# Patient Record
Sex: FEMALE | Race: WHITE | Employment: UNEMPLOYED | URBAN - METROPOLITAN AREA
[De-identification: names, ages, dates, MRNs, and addresses within clinical notes are randomized per-mention and may not be internally consistent; named-entity substitution may affect disease eponyms.]

---

## 2021-05-11 ENCOUNTER — HOSPITAL ENCOUNTER (EMERGENCY)
Age: 51
Discharge: HOME | End: 2021-05-11
Attending: EMERGENCY MEDICINE
Payer: OTHER GOVERNMENT

## 2021-05-11 ENCOUNTER — APPOINTMENT (OUTPATIENT)
Dept: GENERAL RADIOLOGY | Age: 51
End: 2021-05-11
Attending: PHYSICIAN ASSISTANT
Payer: OTHER GOVERNMENT

## 2021-05-11 VITALS
HEART RATE: 93 BPM | RESPIRATION RATE: 18 BRPM | OXYGEN SATURATION: 100 % | WEIGHT: 160 LBS | BODY MASS INDEX: 27.31 KG/M2 | SYSTOLIC BLOOD PRESSURE: 146 MMHG | TEMPERATURE: 98.4 F | DIASTOLIC BLOOD PRESSURE: 80 MMHG | HEIGHT: 64 IN

## 2021-05-11 DIAGNOSIS — S61.219A COMPLICATED LACERATION OF FINGER, INITIAL ENCOUNTER: Primary | ICD-10-CM

## 2021-05-11 PROCEDURE — 73140 X-RAY EXAM OF FINGER(S): CPT

## 2021-05-11 PROCEDURE — 99283 EMERGENCY DEPT VISIT LOW MDM: CPT

## 2021-05-11 PROCEDURE — 77030008304 HC SPLNT FNGR ALUM DERY -A

## 2021-05-11 RX ORDER — CEPHALEXIN 500 MG/1
500 CAPSULE ORAL 4 TIMES DAILY
Qty: 28 CAP | Refills: 0 | Status: SHIPPED | OUTPATIENT
Start: 2021-05-11 | End: 2021-05-18

## 2021-05-11 NOTE — ED PROVIDER NOTES
EMERGENCY DEPARTMENT HISTORY AND PHYSICAL EXAM    Date: 5/11/2021  Patient Name: Cindy Teixeira    History of Presenting Illness     Chief Complaint   Patient presents with    Laceration         History Provided By: Patient    Chief Complaint: laceration to right fifth digit 4 days ago    HPI(Context):   12:25 PM  Cindy Teixeira is a 52 y.o. female with PMHX of anemia who presents to the emergency department C/O laceration to right fifth digit. Associated sxs include reduced ROM of affected finger. Pt reports 4 days ago she was using a recently sharpened knife when she cut her right fifth finger. Pt reports it bled for some time with good hemostasis with pressure. Pt has been cleaning wound with soap and water. Pt was at work and attempted to type today and felt more pain to finger with reduced ROM of affected finger. Pt is concerned for tendon involvement. Last tetanus within 5 years. Pt denies hx of DMII. Sofiya Rad Pt denies numbness, weakness, color change, and any other sxs or complaints. Pt is active smoker. PCP: Jerome      Past History     Past Medical History:  Past Medical History:   Diagnosis Date    Anemia        Past Surgical History:  Past Surgical History:   Procedure Laterality Date    HX APPENDECTOMY         Family History:  History reviewed. No pertinent family history. Social History:  Social History     Tobacco Use    Smoking status: Current Every Day Smoker     Packs/day: 0.50    Smokeless tobacco: Never Used   Substance Use Topics    Alcohol use: Yes     Alcohol/week: 36.0 standard drinks     Types: 36 Cans of beer per week    Drug use: Yes     Types: Marijuana       Allergies: Allergies   Allergen Reactions    Penicillins Hives         Review of Systems   Review of Systems   Musculoskeletal: Positive for arthralgias and joint swelling. Skin: Positive for wound. Allergic/Immunologic: Negative for immunocompromised state. Neurological: Negative for weakness and numbness.    All other systems reviewed and are negative. Physical Exam     Vitals:    05/11/21 1225   BP: (!) 146/80   Pulse: 93   Resp: 18   Temp: 98.4 °F (36.9 °C)   SpO2: 100%   Weight: 72.6 kg (160 lb)   Height: 5' 4\" (1.626 m)     Physical Exam  Vitals signs and nursing note reviewed. Constitutional:       General: She is not in acute distress. Appearance: Normal appearance. Comments:  female in NAD. Alert. Appears comfortable. Ambulatory in ED fast track room. HENT:      Head: Normocephalic and atraumatic. Right Ear: External ear normal.      Left Ear: External ear normal.      Nose: Nose normal.   Eyes:      Conjunctiva/sclera: Conjunctivae normal.   Neck:      Musculoskeletal: Normal range of motion. Cardiovascular:      Rate and Rhythm: Normal rate and regular rhythm. Pulses: Normal pulses. Radial pulses are 2+ on the right side and 2+ on the left side. Heart sounds: Normal heart sounds. Pulmonary:      Effort: Pulmonary effort is normal.      Breath sounds: Normal breath sounds. Musculoskeletal:      Right hand: She exhibits decreased range of motion (right 5th DIP), tenderness and laceration (old lac to right fifth finger). She exhibits normal capillary refill and no deformity. Normal sensation noted. Normal strength noted. Hands:    Skin:     General: Skin is warm. Capillary Refill: Capillary refill takes less than 2 seconds. Neurological:      Mental Status: She is alert and oriented to person, place, and time. Sensory: Sensation is intact. Motor: No weakness. Psychiatric:         Behavior: Behavior normal.         Judgment: Judgment normal.             Diagnostic Study Results     Labs -   No results found for this or any previous visit (from the past 12 hour(s)). XR 5TH FINGER RT MIN 2 V   Final Result      No acute osseous abnormality or evidence of retained radiopaque foreign object.         CT Results  (Last 48 hours) None        CXR Results  (Last 48 hours)    None          Medications given in the ED-  Medications - No data to display      Medical Decision Making   I am the first provider for this patient. I reviewed the vital signs, available nursing notes, past medical history, past surgical history, family history and social history. Vital Signs-Reviewed the patient's vital signs. Pulse Oximetry Analysis - 100% on RA. NORMAL     Records Reviewed: Nursing Notes    Provider Notes (Medical Decision Making): old laceration to right 5th finger x 4 days. Mild surrounding erythema but no deep infectious process such as tenosynovitis for abscess. Good sensation and cap refill. Pt unable to bend at DIP of affected finger. Otherwise normal exam.  Will image finger, splint, and refer to Ortho hand specialist.     Procedures:  Procedures    ED Course:   12:25 PM Initial assessment performed. The patients presenting problems have been discussed, and they are in agreement with the care plan formulated and outlined with them. I have encouraged them to ask questions as they arise throughout their visit. Diagnosis and Disposition       Imaging unremarkable. Will splint and cover with ABX given surrounding wound erythema, and refer to Dr. Alie Moran [facility] hand specialist). Reasons to RTED discussed with pt. All questions answered. Pt feels comfortable going home at this time. Pt expressed understanding and she agrees with plan. 1. Complicated laceration of finger, initial encounter        PLAN:  1. D/C Home  2. Current Discharge Medication List      START taking these medications    Details   cephALEXin (Keflex) 500 mg capsule Take 1 Cap by mouth four (4) times daily for 7 days. Indications: an infection of the skin and the tissue below the skin  Qty: 28 Cap, Refills: 0  Start date: 5/11/2021, End date: 5/18/2021           3.    Follow-up Information     Follow up With Specialties Details Why Contact Toño Ojeda Domo Webb MD Orthopedic Surgery, Hand Surgery  please call and make an appointment to see hand specialist. [address]  [address] [place] [address],[address] [place] [address]  [phone]      [facility]    [phone]    [facility] EMERGENCY DEPT Emergency Medicine   [address] [place] [address]  [phone]        _______________________________    Attestations: This note is prepared by Mikayla Condon PA-C.  _______________________________    Please note that this dictation was completed with [employer], the computer voice recognition software. Quite often unanticipated grammatical, syntax, homophones, and other interpretive errors are inadvertently transcribed by the computer software. Please disregard these errors. Please excuse any errors that have escaped final proofreading.

## 2021-05-11 NOTE — ED TRIAGE NOTES
Pt arrives ambulatory to triage desk with complaints of laceration to R pinky finger with knife. Pt states that her pink finger is tingly and numb, but it doesn't hurt. Bleeding controlled. Pt states she can bend it but when she attempted to type on the keyboard today it caused severe pain.